# Patient Record
Sex: MALE | Race: WHITE | NOT HISPANIC OR LATINO
[De-identification: names, ages, dates, MRNs, and addresses within clinical notes are randomized per-mention and may not be internally consistent; named-entity substitution may affect disease eponyms.]

---

## 2021-01-25 ENCOUNTER — ERX REFILL RESPONSE (OUTPATIENT)
Age: 71
End: 2021-01-25

## 2021-01-25 RX ORDER — FAMOTIDINE 40 MG/1
TAKE ONE(1) TABLET BY MOUTH TWICE (2) A DAY TABLET ORAL
Qty: 180 | Refills: 2

## 2021-11-09 ENCOUNTER — WEB ENCOUNTER (OUTPATIENT)
Dept: URBAN - NONMETROPOLITAN AREA CLINIC 2 | Facility: CLINIC | Age: 71
End: 2021-11-09

## 2021-11-09 ENCOUNTER — OFFICE VISIT (OUTPATIENT)
Dept: URBAN - NONMETROPOLITAN AREA CLINIC 2 | Facility: CLINIC | Age: 71
End: 2021-11-09
Payer: COMMERCIAL

## 2021-11-09 ENCOUNTER — LAB OUTSIDE AN ENCOUNTER (OUTPATIENT)
Dept: URBAN - NONMETROPOLITAN AREA CLINIC 2 | Facility: CLINIC | Age: 71
End: 2021-11-09

## 2021-11-09 DIAGNOSIS — K64.8 OTHER HEMORRHOIDS: ICD-10-CM

## 2021-11-09 DIAGNOSIS — K62.5 RECTAL BLEEDING: ICD-10-CM

## 2021-11-09 DIAGNOSIS — Z86.010 PERSONAL HISTORY OF COLONIC POLYPS: ICD-10-CM

## 2021-11-09 DIAGNOSIS — Z79.02 ANTIPLATELET OR ANTITHROMBOTIC LONG-TERM USE: ICD-10-CM

## 2021-11-09 DIAGNOSIS — K21.9 GERD WITHOUT ESOPHAGITIS: ICD-10-CM

## 2021-11-09 PROCEDURE — 99204 OFFICE O/P NEW MOD 45 MIN: CPT | Performed by: NURSE PRACTITIONER

## 2021-11-09 RX ORDER — FAMOTIDINE 40 MG/1
TAKE 1 TABLET (40 MG) BY ORAL ROUTE 2 TIMES PER DAY TABLET, FILM COATED ORAL 2
Qty: 180 | Refills: 3 | Status: ACTIVE | COMMUNITY
Start: 2020-01-09

## 2021-11-09 RX ORDER — FAMOTIDINE 40 MG/1
TAKE ONE(1) TABLET BY MOUTH TWICE (2) A DAY TABLET ORAL
Qty: 180 | Refills: 2 | Status: ACTIVE | COMMUNITY

## 2021-11-09 NOTE — HPI-TODAY'S VISIT:
11/9/2021 Mr. Alberto Starkey is a very pleasant 70 YO M who presents for first visit since 2018 with complaints of rectal bleeding. This began about 6 weeks ago. He started using suppositories that Dr. Nowak prescribed long ago and bleeding is much less frequent. He has gone from seeing brb in the toilet bowl to only seeing a small amount occasionally on the toilet tissue. He reports he can feel some swelling at his anus. His bowel movements are unchanged otherwise.  His reflux is well controlled on famotidine 40mg once daily.  He reports multiple round of antibiotics for tooth infection on a tooth he previously had root canal. He has had packing with antibiotics twice and oral antibiotics x 3, first with Augmentin and last two courses with Keflex.  He was diagnosed with prostate cancer earlier this year and was treated with radiation x 6 weeks. He finished this in April. He did not have change in bowel movements with this but did have some weakness and fatigue.  He is on eliquis that is prescribed by Dr. Jones.  He last had colonoscopy 9/2018 with 12 polyps removed, path shows TA and TVA and recommended to repeat in 3 years. Last EGD 9/2018 was normal. Duoenal bx with no changes. Gastric bx with chornic inflammation. Esophageal bx with chronic inflammation, No Barretts or EoE. TG

## 2021-12-02 ENCOUNTER — TELEPHONE ENCOUNTER (OUTPATIENT)
Dept: URBAN - NONMETROPOLITAN AREA CLINIC 2 | Facility: CLINIC | Age: 71
End: 2021-12-02

## 2021-12-07 ENCOUNTER — OFFICE VISIT (OUTPATIENT)
Dept: URBAN - NONMETROPOLITAN AREA SURGERY CENTER 1 | Facility: SURGERY CENTER | Age: 71
End: 2021-12-07
Payer: COMMERCIAL

## 2021-12-07 ENCOUNTER — TELEPHONE ENCOUNTER (OUTPATIENT)
Dept: URBAN - METROPOLITAN AREA CLINIC 92 | Facility: CLINIC | Age: 71
End: 2021-12-07

## 2021-12-07 DIAGNOSIS — D12.2 ADENOMA OF ASCENDING COLON: ICD-10-CM

## 2021-12-07 DIAGNOSIS — D12.4 ADENOMA OF DESCENDING COLON: ICD-10-CM

## 2021-12-07 DIAGNOSIS — D12.8 ADENOMATOUS POLYP OF RECTUM: ICD-10-CM

## 2021-12-07 DIAGNOSIS — Z86.010 ADENOMAS PERSONAL HISTORY OF COLONIC POLYPS: ICD-10-CM

## 2021-12-07 DIAGNOSIS — D12.3 ADENOMA OF TRANSVERSE COLON: ICD-10-CM

## 2021-12-07 PROCEDURE — 45385 COLONOSCOPY W/LESION REMOVAL: CPT | Performed by: INTERNAL MEDICINE

## 2021-12-07 PROCEDURE — G8907 PT DOC NO EVENTS ON DISCHARG: HCPCS | Performed by: INTERNAL MEDICINE

## 2021-12-07 RX ORDER — FAMOTIDINE 40 MG/1
TAKE 1 TABLET (40 MG) BY ORAL ROUTE 2 TIMES PER DAY TABLET, FILM COATED ORAL 2
Qty: 180 | Refills: 3 | Status: ACTIVE | COMMUNITY
Start: 2020-01-09

## 2021-12-07 RX ORDER — FAMOTIDINE 40 MG/1
TAKE ONE(1) TABLET BY MOUTH TWICE (2) A DAY TABLET ORAL
Qty: 180 | Refills: 2 | Status: ACTIVE | COMMUNITY

## 2021-12-07 RX ORDER — HYDROCORTISONE 25 MG/G
1 APPLICATION CREAM TOPICAL TWICE A DAY
Qty: 30 GRAM | Refills: 3 | OUTPATIENT
Start: 2021-12-07 | End: 2022-02-01

## 2022-01-31 ENCOUNTER — ERX REFILL RESPONSE (OUTPATIENT)
Dept: URBAN - NONMETROPOLITAN AREA CLINIC 2 | Facility: CLINIC | Age: 72
End: 2022-01-31

## 2022-01-31 RX ORDER — FAMOTIDINE 40 MG/1
TAKE ONE TABLET BY MOUTH TWICE DAILY TABLET ORAL
Qty: 180 TABLET | Refills: 3 | OUTPATIENT

## 2022-01-31 RX ORDER — FAMOTIDINE 40 MG/1
TAKE ONE(1) TABLET BY MOUTH TWICE (2) A DAY TABLET ORAL
Qty: 180 | Refills: 2 | OUTPATIENT

## 2022-08-30 ENCOUNTER — TELEPHONE ENCOUNTER (OUTPATIENT)
Dept: URBAN - NONMETROPOLITAN AREA CLINIC 2 | Facility: CLINIC | Age: 72
End: 2022-08-30

## 2022-08-30 RX ORDER — FAMOTIDINE 40 MG/1
TAKE ONE TABLET BY MOUTH TWICE DAILY TABLET ORAL TWICE A DAY
Qty: 60 | Refills: 3

## 2023-03-30 ENCOUNTER — TELEPHONE ENCOUNTER (OUTPATIENT)
Dept: URBAN - NONMETROPOLITAN AREA CLINIC 2 | Facility: CLINIC | Age: 73
End: 2023-03-30

## 2023-03-30 RX ORDER — HYDROCORTISONE ACETATE 25 MG/1
_INSERT 1 SUPPOSITORY (25 MG) BY RECTAL ROUTE 2 TIMES PER DAY FOR 14 DAYS SUPPOSITORY RECTAL 2
Qty: 28 | Refills: 2
Start: 2020-01-09

## 2023-07-05 ENCOUNTER — WEB ENCOUNTER (OUTPATIENT)
Dept: URBAN - NONMETROPOLITAN AREA CLINIC 2 | Facility: CLINIC | Age: 73
End: 2023-07-05

## 2023-07-05 ENCOUNTER — OFFICE VISIT (OUTPATIENT)
Dept: URBAN - NONMETROPOLITAN AREA CLINIC 2 | Facility: CLINIC | Age: 73
End: 2023-07-05
Payer: COMMERCIAL

## 2023-07-05 VITALS
HEIGHT: 74 IN | BODY MASS INDEX: 26.05 KG/M2 | SYSTOLIC BLOOD PRESSURE: 145 MMHG | DIASTOLIC BLOOD PRESSURE: 87 MMHG | WEIGHT: 203 LBS | HEART RATE: 86 BPM

## 2023-07-05 DIAGNOSIS — K21.9 GERD WITHOUT ESOPHAGITIS: ICD-10-CM

## 2023-07-05 DIAGNOSIS — K64.8 OTHER HEMORRHOIDS: ICD-10-CM

## 2023-07-05 DIAGNOSIS — K62.5 RECTAL BLEEDING: ICD-10-CM

## 2023-07-05 DIAGNOSIS — Z79.02 ANTIPLATELET OR ANTITHROMBOTIC LONG-TERM USE: ICD-10-CM

## 2023-07-05 PROBLEM — 70153002: Status: ACTIVE | Noted: 2023-07-05

## 2023-07-05 PROBLEM — 305058001: Status: ACTIVE | Noted: 2021-11-09

## 2023-07-05 PROBLEM — 428283002: Status: ACTIVE | Noted: 2021-11-09

## 2023-07-05 PROCEDURE — 99213 OFFICE O/P EST LOW 20 MIN: CPT

## 2023-07-05 RX ORDER — FAMOTIDINE 40 MG/1
TAKE ONE TABLET BY MOUTH TWICE DAILY TABLET ORAL TWICE A DAY
Qty: 60 | Refills: 3 | Status: ACTIVE | COMMUNITY

## 2023-07-05 RX ORDER — HYDROCORTISONE ACETATE 25 MG/1
_INSERT 1 SUPPOSITORY (25 MG) BY RECTAL ROUTE 2 TIMES PER DAY FOR 14 DAYS SUPPOSITORY RECTAL 2
Qty: 28 | Refills: 2 | Status: ACTIVE | COMMUNITY
Start: 2020-01-09

## 2023-07-05 RX ORDER — CLOPIDOGREL 75 MG/1
1 TABLET TABLET, FILM COATED ORAL ONCE A DAY
Status: ACTIVE | COMMUNITY

## 2023-07-05 RX ORDER — FAMOTIDINE 40 MG/1
TAKE 1 TABLET (40 MG) BY ORAL ROUTE 2 TIMES PER DAY TABLET, FILM COATED ORAL 2
Qty: 180 | Refills: 3 | Status: ACTIVE | COMMUNITY
Start: 2020-01-09

## 2023-07-05 NOTE — HPI-TODAY'S VISIT:
7/5/2023 Mr. Starkey returns to clinic previously managed by Dr. Nowak and Evelyn for history of colon polyps and hemorrhoids, with bleeding secondary to chronic anticoagulation for atrial fibrillation.  He recently had a flare and called in for refill of suppositories with increased rectal bleeding.  This was more than 3 months ago and has since slowed.  He has undergone the Watchman implant and will hopefully come off of full-strength anticoagulation.  He would like a new refill of suppositories preferring the Premier compounded version.  He is having to take oral iron and notes subsequent dark stool.  He is on Plavix after his watchman implant and will have his ALFRED in September.  He avoid straining and uses sitz bath's for flares.  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  He would like to hopefully have his rectal bleeding resolved prior to upcoming trip overseas. SP

## 2023-11-07 ENCOUNTER — OFFICE VISIT (OUTPATIENT)
Dept: URBAN - NONMETROPOLITAN AREA CLINIC 2 | Facility: CLINIC | Age: 73
End: 2023-11-07
Payer: COMMERCIAL

## 2023-11-07 ENCOUNTER — DASHBOARD ENCOUNTERS (OUTPATIENT)
Age: 73
End: 2023-11-07

## 2023-11-07 VITALS
HEIGHT: 74 IN | SYSTOLIC BLOOD PRESSURE: 128 MMHG | WEIGHT: 194 LBS | BODY MASS INDEX: 24.9 KG/M2 | HEART RATE: 79 BPM | DIASTOLIC BLOOD PRESSURE: 89 MMHG

## 2023-11-07 DIAGNOSIS — K21.9 GERD (GASTROESOPHAGEAL REFLUX DISEASE): ICD-10-CM

## 2023-11-07 DIAGNOSIS — Z86.010 PERSONAL HISTORY OF COLONIC POLYPS: ICD-10-CM

## 2023-11-07 DIAGNOSIS — K59.01 CONSTIPATION: ICD-10-CM

## 2023-11-07 DIAGNOSIS — R10.84 GENERALIZED ABDOMINAL PAIN: ICD-10-CM

## 2023-11-07 PROCEDURE — 99213 OFFICE O/P EST LOW 20 MIN: CPT

## 2023-11-07 RX ORDER — CLOPIDOGREL 75 MG/1
1 TABLET TABLET, FILM COATED ORAL ONCE A DAY
Status: ACTIVE | COMMUNITY

## 2023-11-07 RX ORDER — FAMOTIDINE 40 MG/1
TAKE 1 TABLET (40 MG) BY ORAL ROUTE 2 TIMES PER DAY TABLET, FILM COATED ORAL 2
Qty: 180 | Refills: 3 | Status: ACTIVE | COMMUNITY
Start: 2020-01-09

## 2023-11-07 RX ORDER — FAMOTIDINE 40 MG/1
TAKE ONE TABLET BY MOUTH TWICE DAILY TABLET ORAL TWICE A DAY
Qty: 60 | Refills: 3 | Status: ACTIVE | COMMUNITY

## 2023-11-07 RX ORDER — HYDROCORTISONE ACETATE 25 MG/1
_INSERT 1 SUPPOSITORY (25 MG) BY RECTAL ROUTE 2 TIMES PER DAY FOR 14 DAYS SUPPOSITORY RECTAL 2
Qty: 28 | Refills: 2 | Status: ACTIVE | COMMUNITY
Start: 2020-01-09

## 2023-11-07 NOTE — HPI-TODAY'S VISIT:
7/5/2023 Mr. Starkey returns to clinic previously managed by Dr. Nowak and Evelyn for history of colon polyps and hemorrhoids, with bleeding secondary to chronic anticoagulation for atrial fibrillation.  He recently had a flare and called in for refill of suppositories with increased rectal bleeding.  This was more than 3 months ago and has since slowed.  He has undergone the Watchman implant and will hopefully come off of full-strength anticoagulation.  He would like a new refill of suppositories preferring the Premier compounded version.  He is having to take oral iron and notes subsequent dark stool.  He is on Plavix after his watchman implant and will have his ALFRED in September.  He avoid straining and uses sitz bath's for flares.  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  He would like to hopefully have his rectal bleeding resolved prior to upcoming trip overseas. SP  11/7/2023 Mr. Starkey returns for follow up. His GERD is well controlled. Today he states the suppositories were working to manage his hemorrhoid flare but he did have unwanted discharge of the medication. He would like a referral to discuss surgery with Dr. Jacobson hopeful to improve prior to his overseas trip next May.  He is now off Plavix, however was told by Dr. Lynn he needs TAVR within next  months.  IN addition he is on a soft diet with his dental implant base , anticipates crown in future.  He is managing constipation ,discussed using Miralax to avoid difficult stools and straining with the changes ahead. SP

## 2024-08-07 ENCOUNTER — OFFICE VISIT (OUTPATIENT)
Dept: URBAN - NONMETROPOLITAN AREA CLINIC 2 | Facility: CLINIC | Age: 74
End: 2024-08-07
Payer: MEDICARE

## 2024-08-07 VITALS
DIASTOLIC BLOOD PRESSURE: 88 MMHG | SYSTOLIC BLOOD PRESSURE: 151 MMHG | WEIGHT: 186 LBS | BODY MASS INDEX: 23.87 KG/M2 | HEIGHT: 74 IN | HEART RATE: 64 BPM

## 2024-08-07 DIAGNOSIS — R10.84 GENERALIZED ABDOMINAL PAIN: ICD-10-CM

## 2024-08-07 DIAGNOSIS — R15.1 FECAL SMEARING: ICD-10-CM

## 2024-08-07 DIAGNOSIS — K59.01 CONSTIPATION: ICD-10-CM

## 2024-08-07 DIAGNOSIS — K21.9 GERD (GASTROESOPHAGEAL REFLUX DISEASE): ICD-10-CM

## 2024-08-07 PROCEDURE — 99213 OFFICE O/P EST LOW 20 MIN: CPT

## 2024-08-07 RX ORDER — FAMOTIDINE 40 MG/1
TAKE 1 TABLET (40 MG) BY ORAL ROUTE 2 TIMES PER DAY TABLET, FILM COATED ORAL 2
Qty: 180 | Refills: 3 | Status: ACTIVE | COMMUNITY
Start: 2020-01-09

## 2024-08-07 RX ORDER — CLOPIDOGREL 75 MG/1
1 TABLET TABLET, FILM COATED ORAL ONCE A DAY
Status: ACTIVE | COMMUNITY

## 2024-08-07 RX ORDER — DICYCLOMINE HYDROCHLORIDE 10 MG/1
2 CAPSULES CAPSULE ORAL
Qty: 120 CAPSULE | Refills: 0 | OUTPATIENT
Start: 2024-08-07 | End: 2024-09-06

## 2024-08-07 RX ORDER — FAMOTIDINE 40 MG/1
TAKE ONE TABLET BY MOUTH TWICE DAILY TABLET ORAL TWICE A DAY
Qty: 60 | Refills: 3 | Status: ACTIVE | COMMUNITY

## 2024-08-07 RX ORDER — HYDROCORTISONE ACETATE 25 MG/1
_INSERT 1 SUPPOSITORY (25 MG) BY RECTAL ROUTE 2 TIMES PER DAY FOR 14 DAYS SUPPOSITORY RECTAL 2
Qty: 28 | Refills: 2 | Status: ACTIVE | COMMUNITY
Start: 2020-01-09

## 2024-08-07 NOTE — HPI-TODAY'S VISIT:
7/5/2023 Mr. Starkey returns to clinic previously managed by Dr. Nowak and Evelyn for history of colon polyps and hemorrhoids, with bleeding secondary to chronic anticoagulation for atrial fibrillation.  He recently had a flare and called in for refill of suppositories with increased rectal bleeding.  This was more than 3 months ago and has since slowed.  He has undergone the Watchman implant and will hopefully come off of full-strength anticoagulation.  He would like a new refill of suppositories preferring the Premier compounded version.  He is having to take oral iron and notes subsequent dark stool.  He is on Plavix after his watchman implant and will have his ALFRED in September.  He avoid straining and uses sitz bath's for flares.  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  He would like to hopefully have his rectal bleeding resolved prior to upcoming trip overseas. SP  11/7/2023 Mr. Starkey returns for follow up. His GERD is well controlled. Today he states the suppositories were working to manage his hemorrhoid flare but he did have unwanted discharge of the medication. He would like a referral to discuss surgery with Dr. Jacobson hopeful to improve prior to his overseas trip next May.  He is now off Plavix, however was told by Dr. Lynn he needs TAVR within next  months.  IN addition he is on a soft diet with his dental implant base , anticipates crown in future.  He is managing constipation ,discussed using Miralax to avoid difficult stools and straining with the changes ahead. SP  8/7/2024 Mr. Starkey returns following Hemorrhoidectomy and leakage physical therapy and pelvic floor waxes and wanes

## 2024-08-09 ENCOUNTER — P2P PATIENT RECORD (OUTPATIENT)
Age: 74
End: 2024-08-09

## 2024-12-05 ENCOUNTER — OFFICE VISIT (OUTPATIENT)
Dept: URBAN - NONMETROPOLITAN AREA CLINIC 2 | Facility: CLINIC | Age: 74
End: 2024-12-05
Payer: MEDICARE

## 2024-12-05 VITALS
HEIGHT: 74 IN | WEIGHT: 185 LBS | DIASTOLIC BLOOD PRESSURE: 85 MMHG | HEART RATE: 76 BPM | SYSTOLIC BLOOD PRESSURE: 123 MMHG | BODY MASS INDEX: 23.74 KG/M2

## 2024-12-05 DIAGNOSIS — Z86.0101 PERSONAL HISTORY OF ADENOMATOUS AND SERRATED COLON POLYPS: ICD-10-CM

## 2024-12-05 DIAGNOSIS — K59.00 CONSTIPATION: ICD-10-CM

## 2024-12-05 DIAGNOSIS — Z86.0100 HISTORY OF COLON POLYPS: ICD-10-CM

## 2024-12-05 DIAGNOSIS — K57.90 DIVERTICULOSIS: ICD-10-CM

## 2024-12-05 DIAGNOSIS — R15.1 FECAL SMEARING: ICD-10-CM

## 2024-12-05 DIAGNOSIS — K21.9 GERD WITHOUT ESOPHAGITIS: ICD-10-CM

## 2024-12-05 DIAGNOSIS — K64.9 HEMORRHOIDS, UNSPECIFIED HEMORRHOID TYPE: ICD-10-CM

## 2024-12-05 DIAGNOSIS — Z79.02 ANTIPLATELET OR ANTITHROMBOTIC LONG-TERM USE: ICD-10-CM

## 2024-12-05 PROBLEM — 225593006: Status: ACTIVE | Noted: 2024-12-05

## 2024-12-05 PROBLEM — 397881000: Status: ACTIVE | Noted: 2024-12-05

## 2024-12-05 PROCEDURE — 99213 OFFICE O/P EST LOW 20 MIN: CPT

## 2024-12-05 RX ORDER — FAMOTIDINE 40 MG/1
TAKE ONE TABLET BY MOUTH TWICE DAILY TABLET ORAL TWICE A DAY
Qty: 60 | Refills: 3 | Status: ACTIVE | COMMUNITY

## 2024-12-05 RX ORDER — FAMOTIDINE 20 MG/1
TAKE 1 TABLET TABLET, FILM COATED ORAL TWICE A DAY
Qty: 180 TABLET | Refills: 3 | OUTPATIENT
Start: 2024-12-05

## 2024-12-05 RX ORDER — FAMOTIDINE 40 MG/1
TAKE 1 TABLET (40 MG) BY ORAL ROUTE 2 TIMES PER DAY TABLET, FILM COATED ORAL 2
Qty: 180 | Refills: 3 | Status: ACTIVE | COMMUNITY
Start: 2020-01-09

## 2024-12-05 RX ORDER — HYDROCORTISONE ACETATE 25 MG/1
_INSERT 1 SUPPOSITORY (25 MG) BY RECTAL ROUTE 2 TIMES PER DAY FOR 14 DAYS SUPPOSITORY RECTAL 2
Qty: 28 | Refills: 2 | Status: ACTIVE | COMMUNITY
Start: 2020-01-09

## 2024-12-05 RX ORDER — METOPROLOL SUCCINATE 25 MG/1
TABLET, EXTENDED RELEASE ORAL
Qty: 90 TABLET | Status: ACTIVE | COMMUNITY

## 2024-12-05 RX ORDER — DICYCLOMINE HYDROCHLORIDE 10 MG/1
2 CAPSULES CAPSULE ORAL ONCE A DAY
Qty: 60 CAPSULE | Refills: 5 | OUTPATIENT
Start: 2024-12-05 | End: 2025-06-03

## 2024-12-05 RX ORDER — CLOPIDOGREL 75 MG/1
1 TABLET TABLET, FILM COATED ORAL ONCE A DAY
Status: ACTIVE | COMMUNITY

## 2024-12-05 RX ORDER — ATORVASTATIN CALCIUM 20 MG/1
TABLET, FILM COATED ORAL
Qty: 90 TABLET | Status: ACTIVE | COMMUNITY

## 2024-12-05 NOTE — HPI-TODAY'S VISIT:
7/5/2023 Mr. Starkey returns to clinic previously managed by Dr. Nowak and Evelyn for history of colon polyps and hemorrhoids, with bleeding secondary to chronic anticoagulation for atrial fibrillation.  He recently had a flare and called in for refill of suppositories with increased rectal bleeding.  This was more than 3 months ago and has since slowed.  He has undergone the Watchman implant and will hopefully come off of full-strength anticoagulation.  He would like a new refill of suppositories preferring the Premier compounded version.  He is having to take oral iron and notes subsequent dark stool.  He is on Plavix after his watchman implant and will have his ALFRED in September.  He avoid straining and uses sitz bath's for flares.  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  He would like to hopefully have his rectal bleeding resolved prior to upcoming trip overseas. SP  11/7/2023 Mr. Starkey returns for follow up. His GERD is well controlled. Today he states the suppositories were working to manage his hemorrhoid flare but he did have unwanted discharge of the medication. He would like a referral to discuss surgery with Dr. Jacobson hopeful to improve prior to his overseas trip next May.  He is now off Plavix, however was told by Dr. Lynn he needs TAVR within next  months.  IN addition he is on a soft diet with his dental implant base , anticipates crown in future.  He is managing constipation ,discussed using Miralax to avoid difficult stools and straining with the changes ahead. SP  8/7/2024 Mr. Starkey returns following Hemorrhoidectomy and leakage physical therapy and pelvic floor waxes and wanes 12/5/2024 Returns to clinic for follow-up with history of constipation, fecal smearing, GERD, hemorrhoids and colon polyps. He is recovering well from his hemorrhoidectomy this spring and continues with pelvic floor therapy.  He has no more flares leakage or bleeding.  He is managing with fiber and probiotic.  He denies constipation is having regular stool very rare to have less stool urgency and he is weaned down to dicyclomine as needed for mild episodes.  If he feels he needs it he may use Imodium rarely.  He is not using any laxatives or stool softeners he is drinking increased water and may take an extra fiber capsule if needed.  He feels his diet is improved as he has a significant other now and they are cooking and eating together. Patient feels he is 80% better from a GI standpoint.  He he also feels that he can wean down from his famotidine which she is taking 40 mg twice a day. He would like to start 20 mg twice a day.  His weight is stable.  He has noted that dairy has in the past flared his loose stool.  Overall he is doing much better.  Will request his records from Dr. Jacobson as he states he had his full colonoscopy with no removal of polyps.  He does have a family history and he himself has had polyps on almost every colonoscopy has had completed. We discussed he will likely repeat additionally before the age of 78 and he is agreeable. Will see him back in 1 year sooner if needed. SP

## 2025-04-03 NOTE — PHYSICAL EXAM PSYCH:
normal mood with appropriate affect , speech clear PAST SURGICAL HISTORY:  No significant past surgical history

## 2025-04-10 NOTE — PHYSICAL EXAM CARDIOVASCULAR:
[facility]  [address] [place] [address]      PROCEDURE: Fluoroscopically-guided L5/S1 Lumbar Interlaminar Epidural Steroid Injection     PRE-OP DIAGNOSIS: Lumbar radiculopathy  POST-OP DIAGNOSIS: Same    BLOOD THINNERS (ANTIPLATELETS/ANTICOAGULANTS): Were discussed with the patient and GANESH Guidelines were followed.     CONSENT: Risks, benefits and options were explained to the patient, all questions were answered and written informed consent was obtained.     ANESTHESIA: Local Only     PROCEDURE NOTE: A pre-procedural time out was performed to confirm the correct patient, procedure, side, and site. Standard ASA monitors were applied and oxygen via nasal cannula was provided. All proceduralists donned sterile gloves with masks and surgical hats. The patient was placed prone with pillow under the abdomen and all pressure points padded. The patient's lumbar spine was prepped in standard fashion using [Chlorhexidine] and draped with sterile towels. The target lumbar interspace was identified using fluoroscopy. The overlying skin and subcutaneous tissue was anesthetized with 1% lidocaine. A 20 gauge 10 cm Tuohy needle was advanced using intermittent AP and lateral fluoroscopy. The ligamentum flavum was engaged. Access to the epidural space was gained using a loss of resistance technique to air. Needle placement was confirmed with 1 ml of Omnipaque 180 mgI/ml contrast using biplanar live fluoroscopic imaging. An epidurogram was noted without evidence of intravascular or intrathecal spread. After negative aspiration, a mixture containing 3 ml of preservative-free normal saline, dexamethasone 10mg steroid, lidocaine 1% - 2 ml local anesthetic for a total volume of 6 ml was injected under direct visualization with fluoroscopy. The Tuohy needle was flushed, removed and a bandage applied.     EBL: None     COMPLICATIONS: None     The patient tolerated the procedure well.  regular rate and rhythm , no murmurs, rubs, gallops Vital signs were stable. Sensory and motor exam was unchanged from baseline. The patient was observed in recovery and was discharged after meeting established criteria.    FOLLOW UP: As scheduled     ADDITIONAL NOTES:     [facility]   Thang Crockett MD      CODES:  [id number]